# Patient Record
(demographics unavailable — no encounter records)

---

## 2025-02-19 NOTE — HISTORY OF PRESENT ILLNESS
[Right] : right hand dominant [FreeTextEntry1] : He presents today for evaluation of his right volar middle finger mass. [facility] was evaluated [facility]. It has been occurring for . It is painful. It limits finger motion. He has had no prior aspirations of the mass. Denies any recent trauma. Denies numbness/tingling. Denies clicking or locking of the digits.

## 2025-02-19 NOTE — ASSESSMENT
[FreeTextEntry1] : The details of the mass, treatment alternatives, and the associated risks, complications, limitations, and expectations have been reviewed and discussed with the patient. Risks include, but are not limited to, infection, swelling, bleeding, stiffness, scarring, and need for surgery. All questions have been answered. The patient has expressed acceptance and understanding. I have proposed to obtain further imaging of the mass, as this does not appear like a straightforward ganglion cyst, given the color change. He will be scheduled at his convenience.

## 2025-02-19 NOTE — REVIEW OF SYSTEMS
[Right] : right [Joint Pain] : no joint pain [Joint Stiffness] : joint stiffness [Joint Swelling] : joint swelling [Negative] : Allergic/Immunologic

## 2025-02-19 NOTE — PHYSICAL EXAM
[de-identified] : Patient is alert, oriented and in no acute distress. Affect and general appearance are normal and patient is able to answer questions appropriately. There is full painless range of motion of the elbow except for affected finger. On the radial side of right middle finger, there is a 1.5 cm x 1.5cm mass over the volar radial right middle finger with tenderness to palpation. Mass is hard and bouncy in nature.   Neurologic: Median, ulnar, and radial motor and sensory are intact.  Skin: No cyanosis, clubbing, edema or rashes.  Vascular: Radial pulses intact.  Lymphatic: No streaking or epitrochlear adenopathy. [de-identified] : Ultrasound obtained of the radial right middle finger shows a cystic like structure with possible vascular enhancement. Underlying bones and tendons appear normal.